# Patient Record
Sex: FEMALE | Race: ASIAN | NOT HISPANIC OR LATINO | Employment: FULL TIME | URBAN - METROPOLITAN AREA
[De-identification: names, ages, dates, MRNs, and addresses within clinical notes are randomized per-mention and may not be internally consistent; named-entity substitution may affect disease eponyms.]

---

## 2019-11-02 ENCOUNTER — NURSE TRIAGE (OUTPATIENT)
Dept: NURSING | Facility: CLINIC | Age: 34
End: 2019-11-02

## 2019-11-02 NOTE — TELEPHONE ENCOUNTER
"She had a tuba ligation 6 years ago. She just started using a mini pad today. She will call back and set up an appointment if anything worsens, changes or something else develops.  Iesha Mcgregor RN-[facility]      Additional Information    Negative: Shock suspected (e.g., cold/pale/clammy skin, too weak to stand, low BP, rapid pulse)    Negative: Difficult to awaken or acting confused (e.g., disoriented, slurred speech)    Negative: Passed out (i.e., lost consciousness, collapsed and was not responding)    Negative: Sounds like a life-threatening emergency to the triager    Negative: Followed a genital area injury    Negative: Pregnant > 20 weeks  (5 months or more)    Negative: Pregnant < 20 weeks  (less than 5 months)    Negative: Postpartum < 1 month since delivery (\"Did you recently give birth?\")    Negative: Bleeding occurring > 12 months after menopause    Negative: Bleeding from sexual abuse or rape    Negative: [1] Vaginal discharge is main symptom AND [2] small amount of blood    Negative: SEVERE abdominal pain     Pain at 4, comes and goes    Negative: SEVERE dizziness (e.g., unable to stand, requires support to walk, feels like passing out now)    Negative: SEVERE vaginal bleeding (i.e., soaking 2 pads or tampons per hour and present 2 or more hours; 1 menstrual cup every 2 hours)    Negative: Patient sounds very sick or weak to the triager    Negative: MODERATE vaginal bleeding (i.e., soaking 1 pad or tampon per hour and present > 6 hours; 1 menstrual cup every 6 hours)    Negative: [1] Constant abdominal pain AND [2] present > 2 hours    Negative: Pale skin (pallor) of new onset or worsening    Negative: Passed tissue (e.g., gray-white)    Negative: Taking Coumadin (warfarin) or other strong blood thinner, or known bleeding disorder (e.g., thrombocytopenia)    Negative: [1] Skin bruises or nosebleed AND [2] not caused by an injury    Negative: [1] Periods with > 6 soaked pads or " tampons per day AND [2] last > 7 days    Negative: [1] Bleeding or spotting after procedure (e.g., biopsy) or pelvic examination (e.g., pap smear) AND [2] lasts > 7 days    Negative: Periods with > 6 soaked pads or tampons per day    Negative: Periods last > 7 days    Negative: [1] Uses menstrual cups AND [2] more than 80 ml blood per menstrual period.    Negative: [1] Missed period AND [2] has occurred 2 or more times in the last year    Negative: [1] Menstrual cycle < 21 days OR > 35 days AND [2] occurs more than two cycles (2 months) this past year    Negative: [1] Bleeding or spotting between regular periods AND [2] occurs more than two cycles (2 months) this past year    Negative: [1] Bleeding or spotting between regular periods AND [2] occurs more than two cycles (2 months) AND [3] using birth control medicine (pills, patch, Depo-Provera, Implanon, vaginal ring, Mirena IUD)    Negative: Bleeding or spotting occurs after hysterectomy    Negative: Bleeding or spotting occurs after sex (Exception: first intercourse)    Negative: Normal menstrual flow    [1] Menstrual cycle < 21 days OR > 35 days AND [2] has occurred once this past year    Protocols used: VAGINAL BLEEDING - [id number]

## 2019-11-08 ENCOUNTER — HEALTH MAINTENANCE LETTER (OUTPATIENT)
Age: 34
End: 2019-11-08

## 2019-11-21 ENCOUNTER — OFFICE VISIT (OUTPATIENT)
Dept: OBGYN | Facility: CLINIC | Age: 34
End: 2019-11-21
Payer: COMMERCIAL

## 2019-11-21 VITALS
SYSTOLIC BLOOD PRESSURE: 104 MMHG | HEIGHT: 67 IN | DIASTOLIC BLOOD PRESSURE: 70 MMHG | WEIGHT: 172.5 LBS | BODY MASS INDEX: 27.07 KG/M2

## 2019-11-21 DIAGNOSIS — Z86.2 HISTORY OF IRON DEFICIENCY ANEMIA: ICD-10-CM

## 2019-11-21 DIAGNOSIS — Z86.39 HISTORY OF VITAMIN D DEFICIENCY: ICD-10-CM

## 2019-11-21 DIAGNOSIS — N92.6 IRREGULAR PERIODS: ICD-10-CM

## 2019-11-21 DIAGNOSIS — N76.0 BV (BACTERIAL VAGINOSIS): ICD-10-CM

## 2019-11-21 DIAGNOSIS — B96.89 BV (BACTERIAL VAGINOSIS): ICD-10-CM

## 2019-11-21 DIAGNOSIS — Z86.32 HISTORY OF INSULIN CONTROLLED GESTATIONAL DIABETES MELLITUS (GDM): ICD-10-CM

## 2019-11-21 DIAGNOSIS — Z01.419 WELL WOMAN EXAM: Primary | ICD-10-CM

## 2019-11-21 DIAGNOSIS — N89.8 VAGINAL DISCHARGE: ICD-10-CM

## 2019-11-21 DIAGNOSIS — Z12.4 CERVICAL CANCER SCREENING: ICD-10-CM

## 2019-11-21 LAB
CHOLEST SERPL-MCNC: 155 MG/DL
DEPRECATED CALCIDIOL+CALCIFEROL SERPL-MC: 9 UG/L (ref 20–75)
ERYTHROCYTE [DISTWIDTH] IN BLOOD BY AUTOMATED COUNT: 13.5 % (ref 10–15)
GLUCOSE SERPL-MCNC: 97 MG/DL (ref 70–99)
HCT VFR BLD AUTO: 37.5 % (ref 35–47)
HDLC SERPL-MCNC: 44 MG/DL
HGB BLD-MCNC: 12.5 G/DL (ref 11.7–15.7)
LDLC SERPL CALC-MCNC: 89 MG/DL
MCH RBC QN AUTO: 27.2 PG (ref 26.5–33)
MCHC RBC AUTO-ENTMCNC: 33.3 G/DL (ref 31.5–36.5)
MCV RBC AUTO: 82 FL (ref 78–100)
NONHDLC SERPL-MCNC: 111 MG/DL
PLATELET # BLD AUTO: 292 10E9/L (ref 150–450)
RBC # BLD AUTO: 4.6 10E12/L (ref 3.8–5.2)
SPECIMEN SOURCE: ABNORMAL
TRIGL SERPL-MCNC: 108 MG/DL
TSH SERPL DL<=0.005 MIU/L-ACNC: 1.55 MU/L (ref 0.4–4)
WBC # BLD AUTO: 4 10E9/L (ref 4–11)
WET PREP SPEC: ABNORMAL

## 2019-11-21 PROCEDURE — 99213 OFFICE O/P EST LOW 20 MIN: CPT | Mod: 25 | Performed by: ADVANCED PRACTICE MIDWIFE

## 2019-11-21 PROCEDURE — 80061 LIPID PANEL: CPT | Performed by: ADVANCED PRACTICE MIDWIFE

## 2019-11-21 PROCEDURE — 87624 HPV HI-RISK TYP POOLED RSLT: CPT | Performed by: ADVANCED PRACTICE MIDWIFE

## 2019-11-21 PROCEDURE — 87210 SMEAR WET MOUNT SALINE/INK: CPT | Performed by: ADVANCED PRACTICE MIDWIFE

## 2019-11-21 PROCEDURE — 85027 COMPLETE CBC AUTOMATED: CPT | Performed by: ADVANCED PRACTICE MIDWIFE

## 2019-11-21 PROCEDURE — 84443 ASSAY THYROID STIM HORMONE: CPT | Performed by: ADVANCED PRACTICE MIDWIFE

## 2019-11-21 PROCEDURE — G0145 SCR C/V CYTO,THINLAYER,RESCR: HCPCS | Performed by: ADVANCED PRACTICE MIDWIFE

## 2019-11-21 PROCEDURE — 82947 ASSAY GLUCOSE BLOOD QUANT: CPT | Performed by: ADVANCED PRACTICE MIDWIFE

## 2019-11-21 PROCEDURE — 99385 PREV VISIT NEW AGE 18-39: CPT | Performed by: ADVANCED PRACTICE MIDWIFE

## 2019-11-21 PROCEDURE — 82306 VITAMIN D 25 HYDROXY: CPT | Performed by: ADVANCED PRACTICE MIDWIFE

## 2019-11-21 PROCEDURE — 36415 COLL VENOUS BLD VENIPUNCTURE: CPT | Performed by: ADVANCED PRACTICE MIDWIFE

## 2019-11-21 RX ORDER — METRONIDAZOLE 500 MG/1
500 TABLET ORAL 2 TIMES DAILY
Qty: 14 TABLET | Refills: 0 | Status: SHIPPED | OUTPATIENT
Start: 2019-11-21 | End: 2022-03-09

## 2019-11-21 ASSESSMENT — MIFFLIN-ST. JEOR: SCORE: 1505.08

## 2019-11-21 NOTE — NURSING NOTE
"Chief Complaint   Patient presents with     Physical       Initial /70   Ht 1.702 m (5' 7\")   Wt 78.2 kg (172 lb 8 oz)   LMP 10/17/2019   Breastfeeding No   BMI 27.02 kg/m   Estimated body mass index is 27.02 kg/m  as calculated from the following:    Height as of this encounter: 1.702 m (5' 7\").    Weight as of this encounter: 78.2 kg (172 lb 8 oz).  BP completed using cuff size: regular    Questioned patient about current smoking habits.  Pt. has never smoked.          The following HM Due: NONE    Jamil Douglas MA      "

## 2019-11-21 NOTE — PROGRESS NOTES
Luis is a 34 year old  female who presents for annual exam.     Besides routine health maintenance,  she would like to discuss irregular periods.  HPI:  Luis normally has regular cycles every 28-30 days. In May she started intermittent fasting and exercising regularly and has noted some period irregularities at the same time. She missed a period in , then had normal cycles, then missed a period in September, and had normal periods Oct 17 and Nov 12. Periods normally last 6-7 days of regular bleeding. No severe cramps or heavy bleeding. Mid cycle she had spotting from -7 every time she wiped after voiding. Denies constipation, does report a hemorrhoid which has never caused her problems. Has had increase in white vaginal discharge this week. No vaginal itching or burning. No urinary symptoms.   LMP 2019  Using tubal ligation for contraception since .        REPRODUCTIVE/GYNECOLOGIC HISTORY:  Luis is sexually active with male partner(s) and is currently in monogamous relationship.   STI testing offered?  Declined  History of abnormal Pap smear:  No  SOCIAL HISTORY  Abuse: does not report having previously been physical or sexually abused.    Do you feel safe in your environment? yes    She  reports that she has never smoked. She has never used smokeless tobacco.      HEALTH MAINTENANCE:  Cholesterol: (  Cholesterol   Date Value Ref Range Status   2015 133 <200 mg/dL Final     Comment:     LDL Cholesterol is the primary guide to therapy.   The NCEP recommends further evaluation of: patients with cholesterol greater   than 200 mg/dL if additional risk factors are present, cholesterol greater   than   240 mg/dL, triglycerides greater than 150 mg/dL, or HDL less than 40 mg/dL.     2010 149 0 - 200 mg/dL Final     Comment:     LDL Cholesterol is the primary guide to therapy.   The NCEP recommends further evaluation of: patients with cholesterol <200   mg/dL   if additional risk  factors are present, cholesterol >240 mg/dL, triglycerides   >150 mg/dL, or HDL <40 mg/dL.    History of abnormal lipids: No  Mammo: N/A . History of abnormal Mammo: Not applicable.  Regular Self Breast Exams: Yes  TSH: (No results found for: TSH )  Pap; (  Lab Results   Component Value Date    PAP NIL 2015    PAP NIL 2012    )  Immunizations up to date: yes  (Gardasil, Tdap, Flu)  Health maintenance updated:  yes    HEALTHY HABITS  Eating habits: follows vegetarian diet  Calcium/Vitamin D intake: source:  dairy Adequate?   Exercise: How often do you exercise? 3-5 times/week;Cardio  Have you had an eye exam in the last two years? yes  Do you routinely see the dentist once or twice yearly? yes      HISTORY:  OB History    Para Term  AB Living   2 2 1 0 0 2   SAB TAB Ectopic Multiple Live Births   0 0 0 0 2      # Outcome Date GA Lbr Francisco/2nd Weight Sex Delivery Anes PTL Lv   2 Term 05/15/13 40w6d 03:50 / 03:48 4 kg (8 lb 13.1 oz) F Vag-Spont  N MARIE      Name: CHEMUDUPATI,G1 SITHA      Apgar1: 9  Apgar5: 9   1 Para  40w0d  3.572 kg (7 lb 14 oz) F    MARIE      Birth Comments: Gestational diabetes      Name: Marion     Past Medical History:   Diagnosis Date     Gestational diabetes mellitus, class A2     pregnancy  - required insulin     Impaired fasting glucose      Iron deficiency anemia     diagnosed at [facility]      Seizures (H)     had a seizure during delivery     Vitamin D deficiency      Past Surgical History:   Procedure Laterality Date     LAPAROSCOPIC TUBAL LIGATION Bilateral 2013    Procedure: LAPAROSCOPIC TUBAL LIGATION;  Surgeon: Nicole Garcia MD;  Location:  OR     none       Family History   Problem Relation Age of Onset     Family History Negative No family hx of         per pt     Social History     Socioeconomic History     Marital status:      Spouse name: None     Number of children: 1     Years of education: None     Highest education  "level: None   Occupational History     Employer: HOMEMAKER   Social Needs     Financial resource strain: None     Food insecurity:     Worry: None     Inability: None     Transportation needs:     Medical: None     Non-medical: None   Tobacco Use     Smoking status: Never Smoker     Smokeless tobacco: Never Used   Substance and Sexual Activity     Alcohol use: No     Drug use: No     Sexual activity: Yes     Partners: Male   Lifestyle     Physical activity:     Days per week: None     Minutes per session: None     Stress: None   Relationships     Social connections:     Talks on phone: None     Gets together: None     Attends [employer] service: None     Active member of club or organization: None     Attends meetings of clubs or organizations: None     Relationship status: None     Intimate partner violence:     Fear of current or ex partner: None     Emotionally abused: None     Physically abused: None     Forced sexual activity: None   Other Topics Concern     None   Social History Narrative    , 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       Current Outpatient Medications:      metroNIDAZOLE (FLAGYL) 500 MG tablet, Take 1 tablet (500 mg) by mouth 2 times daily, Disp: 14 tablet, Rfl: 0   No Known Allergies    Past medical, surgical, social and family history were reviewed and updated in EPIC.    ROS:   12 point review of systems negative other than symptoms noted below or in the HPI.    PHYSICAL EXAM:  /70   Ht 1.702 m (5' 7\")   Wt 78.2 kg (172 lb 8 oz)   LMP 10/17/2019   Breastfeeding No   BMI 27.02 kg/m     BMI: Body mass index is 27.02 kg/m .  Constitutional: healthy, alert and no distress  Neck: symmetrical, thyroid normal size, no masses present, no lymphadenopathy present.   Cardiovascular: RRR, no murmurs present  Respiratory: breathing unlabored, lungs CTA bilaterally  Breast:normal without masses, tenderness or nipple discharge and no palpable axillary masses or adenopathy  Gastrointestinal: " abdomen soft, non-tender, bowel sounds present  PELVIC EXAM:  Vulva: No lesions, no adenopathy, BUS WNL  Vagina: Moist, pink, discharge consistent with BV  well rugated, no lesions  Cervix:smooth, pink, cervical ectropion noted, non-friable, no bleeding  Uterus: Normal size, non-tender, mobile  Ovaries: No masses palpated  Rectal exam: small external hemorrhoid noted, flesh colored, non tender    ASSESSMENT/PLAN:    ICD-10-CM    1. Well woman exam Z01.419 Lipid panel reflex to direct LDL Fasting   2. History of insulin controlled gestational diabetes mellitus (GDM) Z86.32 Glucose   3. Irregular periods N92.6 CBC with platelets     TSH with free T4 reflex   4. Vaginal discharge N89.8 Wet prep   5. History of iron deficiency anemia Z86.2 CBC with platelets   6. Cervical cancer screening Z12.4 PAP imaged thin layer screen     HPV High Risk Types DNA Cervical   7. History of vitamin D deficiency Z86.39 Vitamin D Deficiency   8. BV (bacterial vaginosis) N76.0 metroNIDAZOLE (FLAGYL) 500 MG tablet    B96.89      Results for orders placed or performed in visit on 11/21/19   CBC with platelets     Status: None   Result Value Ref Range    WBC 4.0 4.0 - 11.0 10e9/L    RBC Count 4.60 3.8 - 5.2 10e12/L    Hemoglobin 12.5 11.7 - 15.7 g/dL    Hematocrit 37.5 35.0 - 47.0 %    MCV 82 78 - 100 fl    MCH 27.2 26.5 - 33.0 pg    MCHC 33.3 31.5 - 36.5 g/dL    RDW 13.5 10.0 - 15.0 %    Platelet Count 292 150 - 450 10e9/L   Wet prep     Status: Abnormal   Result Value Ref Range    Specimen Description Vagina     Wet Prep No Trichomonas seen     Wet Prep Few  Clue cells seen   (A)     Wet Prep No yeast seen     Wet Prep No WBC's seen      Irregular cycles- Likely due to change in diet/exercise. Plan wet prep, TSH/T4, Pap today. If all normal, continue to monitor. Offered ultrasound and hormone testing, patient declines and prefers to monitor as she continues diet/exercise to see if cycles normalize. Does not want OCPs/hormonal treatment at  this time.  Use preparation H on hemorrhoid, could be cause of intermenstrual spotting after wiping.     Wet prep positive for clue cells- will order antibiotics     COUNSELING:   Reviewed preventive health counseling, as reflected in patient instructions  Special attention given to:        Regular exercise       Healthy diet/nutrition   reports that she has never smoked. She has never used smokeless tobacco.      Return in 1 year or PRN    Lilo Arroyo CNM, DINA-BC

## 2019-11-26 LAB
COPATH REPORT: NORMAL
PAP: NORMAL

## 2019-11-28 LAB
FINAL DIAGNOSIS: NORMAL
HPV HR 12 DNA CVX QL NAA+PROBE: NEGATIVE
HPV16 DNA SPEC QL NAA+PROBE: NEGATIVE
HPV18 DNA SPEC QL NAA+PROBE: NEGATIVE
SPECIMEN DESCRIPTION: NORMAL
SPECIMEN SOURCE CVX/VAG CYTO: NORMAL

## 2019-12-24 ENCOUNTER — TELEPHONE (OUTPATIENT)
Dept: OBGYN | Facility: CLINIC | Age: 34
End: 2019-12-24

## 2019-12-24 NOTE — TELEPHONE ENCOUNTER
Pt calls to state that she is on cycle day 43 and she has not had her period.  Tubal.    She also used the flagyl for 7 days and still has vaginal itching (started last night).  White discharge, no odor.  Wonders if she needs any further tx?    Advised that I would share with Lilo and see what her thoughts are.  Discussed period could be abnml due to stress or change in periods.      Deborah MICHELLE R.N.  [facility]

## 2019-12-27 NOTE — TELEPHONE ENCOUNTER
If the patient desires to start birth control pills that would likely regulate her periods. They may be changing due to early shelby-menopause. All of her initial labs were normal at her visit with me. If she desires further work up with hormone testing, she should return to clinic. Vaginal itching and discharge could be due to yeast infection, which sometimes occurs after treatment with antibiotics (Flagyl). She can try Monistat 7 day or return to clinic for wet prep. If symptoms persisted after trying Monistat she should come to clinic.     Lilo Arroyo, TRACY, WHNP-BC

## 2019-12-30 ENCOUNTER — MYC MEDICAL ADVICE (OUTPATIENT)
Dept: OBGYN | Facility: CLINIC | Age: 34
End: 2019-12-30

## 2020-01-07 NOTE — TELEPHONE ENCOUNTER
Pt called back in - states she got her period last night and is wondering if she should continue to use Monistat 7 or hold off.    Ok to leave detailed message or respond back via [employer] preferable before this evening.    Valentina Mary Patient Representative

## 2021-01-15 ENCOUNTER — HEALTH MAINTENANCE LETTER (OUTPATIENT)
Age: 36
End: 2021-01-15

## 2021-09-25 ENCOUNTER — HEALTH MAINTENANCE LETTER (OUTPATIENT)
Age: 36
End: 2021-09-25

## 2022-02-23 ENCOUNTER — TELEPHONE (OUTPATIENT)
Dept: PEDIATRICS | Facility: CLINIC | Age: 37
End: 2022-02-23
Payer: COMMERCIAL

## 2022-02-23 NOTE — TELEPHONE ENCOUNTER
6 days she has been spotting after her period stopped. No chance of pregnancy. TL in 2013. She would like to see OBGYN. Transferred call. Zaria Jones RN on 2/23/2022 at 12:29 PM

## 2022-03-09 ENCOUNTER — OFFICE VISIT (OUTPATIENT)
Dept: MIDWIFE SERVICES | Facility: CLINIC | Age: 37
End: 2022-03-09
Payer: COMMERCIAL

## 2022-03-09 VITALS — SYSTOLIC BLOOD PRESSURE: 116 MMHG | WEIGHT: 177.8 LBS | BODY MASS INDEX: 27.85 KG/M2 | DIASTOLIC BLOOD PRESSURE: 90 MMHG

## 2022-03-09 DIAGNOSIS — N92.0 SPOTTING BETWEEN MENSES: Primary | ICD-10-CM

## 2022-03-09 LAB
CLUE CELLS: ABNORMAL
TRICHOMONAS, WET PREP: ABNORMAL
WBC'S/HIGH POWER FIELD, WET PREP: ABNORMAL
YEAST, WET PREP: ABNORMAL

## 2022-03-09 PROCEDURE — 99213 OFFICE O/P EST LOW 20 MIN: CPT | Performed by: REGISTERED NURSE

## 2022-03-09 PROCEDURE — 87210 SMEAR WET MOUNT SALINE/INK: CPT | Performed by: REGISTERED NURSE

## 2022-03-09 PROCEDURE — 87491 CHLMYD TRACH DNA AMP PROBE: CPT | Performed by: REGISTERED NURSE

## 2022-03-09 PROCEDURE — 87591 N.GONORRHOEAE DNA AMP PROB: CPT | Performed by: REGISTERED NURSE

## 2022-03-09 NOTE — PATIENT INSTRUCTIONS
Dysmenorrhea or painful menstruation     If you have painful periods consider trying the following:      Note on your calendar the beginning and end of each of your periods.       Try to anticipate your period by a couple days and take ibuprofen(Advil or Motrin) 800 mg three times a day for a day or two before your period starts or you can use naproxen (Aleve) 500 mg twice a day.       When your period starts continue with the ibuprofen for as long as you have cramping.       If the ibuprofen doesn't work completely you could add acetaminophen (Tylenol) 650 mg every four hours or 1000 mg every 6 hours      You can also use heat to your abdomen or back either in a tub or shower or with a heating pad.  You may also purchase Thermacare which stick to your abdomen or back and supply heat for up to 8 hours.       During your period try to get a good amount of sleep, eat a healthy well balanced diet, and make sure you drink plenty of water    If these methods do not improve you symptoms make an appointment to see your midwife to discuss hormonal options.    Please call with questions/concerns:    [facility] Certified Nurse Midwives        Patient Education     Understanding PMS and Your Cycle  PMS (premenstrual syndrome) is a medical condition caused by the body's response to a normal menstrual cycle. The menstrual cycle is brought on by changing levels of hormones (chemical messengers) in the body. In some women, normal hormone changes are linked to decreases in serotonin. This is a brain chemical that improves mood. These changes lead to PMS symptoms each month.   The menstrual cycle  During the menstrual cycle, a series of hormone changes prepare a woman's body for pregnancy. The ovaries make hormones, which include estrogen and progesterone. During the cycle, these hormone levels change. This causes the uterus lining to thicken. Hormone changes also lead to the release of an egg (ovulation). If a woman doesn't  become pregnant, her body sheds the thickened lining and the egg during the menstrual period. For many women, the menstrual cycle lasts 4 weeks (28 days). Some women have shorter cycles. Others have longer ones. No matter how many days your cycle is, you can have PMS only if you ovulate.   The PMS cycle  No one knows why some women have PMS and others don't. But PMS symptoms are closely linked to changing levels of estrogen, serotonin, and progesterone:     Estrogen rises during the first half of the menstrual cycle and drops during the second half. In some women, serotonin levels stay mostly steady. But in women with PMS, serotonin drops as estrogen drops. This means serotonin is lowest in the 2 weeks before the period. Women with low serotonin levels are likely to have PMS symptoms.    Progesterone can have a calming effect on the body. This can ease physical symptoms caused by the body's monthly changes. In women with PMS, progesterone may not have this calming effect. This may make symptoms more severe.  Common symptoms of PMS  Physical symptoms  You may have some or all of the following:    Bloating (retaining water)    Breast soreness    Food cravings    Muscle aches    Swollen hands and feet    Appetite changes    Headache    Feeling tired    Skin problems    Belly (abdominal) pain  Emotional symptoms  You may have some or all of the following:    Mood swings    Depression    Crying spells    Being grouchy    Easily upset    Not being around friends and family    Forgetting things    Having trouble focusing    Anxiety    Having trouble sleeping    Angry outbursts    Confusion    Changes in sexual desire  [employer] last reviewed this educational content on 5/1/2020 2000-2021 [employer]. All rights reserved. This information is not intended as a substitute for professional medical care. Always follow your healthcare professional's instructions.           Patient Education     Managing PMS: Diet  "and Nutrition     Nutrients in fresh fruits and vegetables can help you manage PMS.   Maintaining a healthy diet helps your body counter PMS. Certain foods boost serotonin levels and give you the energy to cope with symptoms. Other foods can be avoided to ease symptoms.   About supplements  Ask your healthcare provider about supplements before trying them.  Benefits of a balanced diet  To counter PMS symptoms, maintain a balanced diet. Eat foods from all the food groups: dairy, grains, fruits and vegetables, and protein. When planning meals, know that:     Calcium may ease mood swings, headache, bloating, and irritability. It's found in dairy products such as milk, cheese, and yogurt. Some juices, breads, cereals, and soy products have calcium added (fortified).    Magnesium may relieve bloating and breast tenderness. It's found in many foods, including fresh fruits and vegetables. To help your body get enough magnesium, eat 5 or more servings of a variety of fruits and vegetables a day.    Vitamin B-6 helps the body use serotonin, thereby helping to ease depression. It's found in chicken, fish, potatoes, eggs, and carrots.    Vitamin E may reduce headache and breast tenderness. It's found in nuts such as almonds, peanuts, and hazelnuts. It's also found in green leafy vegetables.  \"Good mood\" foods  Eating foods high in carbohydrates (carbs) and fiber can help you manage PMS. That's because carbs raise serotonin levels. Carbs are also your body's main source of energy. To help keep energy and serotonin levels steady, eat small amounts throughout the day. High-fiber carbs include:     Whole-grain foods. Brown rice, whole-wheat pasta, whole-grain bread, and buckwheat noodles are good choices.    Fresh fruits and vegetables. These are especially fiber-rich when eaten unpeeled.    Beans and legumes. These include kidney beans, peas, and lentils.  Foods to limit  Some foods can make PMS symptoms worse. Know that:    Salt " can cause bloating. Since canned vegetables are often high in salt, buy fresh instead. Flavor with herbs, lemon, or salt-free seasonings.    Sugar is a carb that provides only short bursts of energy. If you crave sugar, choose a food that's also high in fiber, like an unpeeled apple or a bran muffin.    Caffeine can disrupt sleep, which makes symptoms harder to cope with. Caffeine can also cause breast tenderness. Try to limit chocolate and caffeinated drinks, such as coffee or soda.    Alcohol can make you feel depressed and can disrupt sleep. Many kinds of alcohol are also high in sugar. You may try limiting the amount of alcohol you drink.  [employer] last reviewed this educational content on 5/1/2020 2000-2021 [employer]. All rights reserved. This information is not intended as a substitute for professional medical care. Always follow your healthcare professional's instructions.           Patient Education     Managing PMS: Lifestyle Changes  Coping with PMS takes energy. But PMS symptoms can make you feel like you don t have the strength to cope. The key is to work helpful strategies into your daily life. Be active during the day and get enough sleep at night. Take time to relax. And don t be afraid to ask for support.  Being active    Activity raises the amount of oxygen in your body. This makes you feel better and gives you more energy. Exercise may also raise serotonin levels. For best results:  ? Try aerobic activities, such as walking, jogging, biking, swimming, or yoga.  ? Exercise for 30 minutes, most days of the week. If this seems like too much, start with 10 minutes a day and work your way up.  ? Find ways to fit activity into your day. Try taking the stairs instead of the elevator.    Sleeping well  When you re tired, PMS symptoms can be harder to cope with. Aim for at least 8 hours of sleep a night. To sleep better:    Follow a routine before bed. For instance, brush your teeth, read  for half an hour, and turn out the lights at 10 p.m. every night.    Pull down window shades and keep pets out of the bedroom. If you re a light sleeper, try wearing earplugs and an eye mask to block out noise and light.  Taking time to relax  Being relaxed can give you the energy to deal with life s ups and downs. This makes even PMS symptoms easier to cope with. Learn to relax through simple techniques you can do anytime, anywhere. If you think you re too busy, start with just 5 minutes a day. Try:    Taking in a slow, deep breath through your nose. Hold it for 5 counts, then exhale through your mouth. Repeat this 3 times.    Picturing yourself in a peaceful place, such as the countryside. Explore with your mind. Hear birds. Smell freshly cut grass. Enjoy a mental vacation.    Stretching to relax muscles and reduce aches. (If you have back problems, ask your healthcare provider about stretches that are safe for you.)  Finding support  You don t have to deal with PMS alone. To help you cope:    Talk with family and friends. Let them know how they can help when you re dealing with PMS symptoms.    Chat with female friends. Support each other. You may learn some new coping strategies.    Join a support group for women with PMS. Or try a stress management group. Ask your healthcare provider for resources.  Jose Rafael last reviewed this educational content on 2/1/2020 2000-2021 [employer]. All rights reserved. This information is not intended as a substitute for professional medical care. Always follow your healthcare professional's instructions.

## 2022-03-09 NOTE — NURSING NOTE
"Chief Complaint   Patient presents with     Vaginal Bleeding     LMP 2/10/22- had spotting until 3/1       Initial BP (!) 116/90 (Cuff Size: Adult Regular)   Wt 80.6 kg (177 lb 12.8 oz)   LMP 02/10/2022   BMI 27.85 kg/m   Estimated body mass index is 27.85 kg/m  as calculated from the following:    Height as of 19: 1.702 m (5' 7\").    Weight as of this encounter: 80.6 kg (177 lb 12.8 oz).  BP completed using cuff size: regular    Questioned patient about current smoking habits.  Pt. has never smoked.          "

## 2022-03-09 NOTE — PROGRESS NOTES
Midwife Dysfunctional Uterine Bleeding Note    Date: 3/8/2022    CC:  Abnormal Uterine Bleeding    HPI:  Luis Duke is a 37 year old female  presents for evaluation of abnormal uterine bleeding.    Duration of bleeding problem: 1 month  Frequency of bleeding: light spotting since lmp (2/10/22) which stopped on 3/1/22  Flow: soaking about a quarter-sized spot on 1 panty liner each day  Post-coital bleeding: no  Pelvic pain: no; mild cramping with periods, but not continuous or midcycle    Her work-up thus far for her abnormal bleeding has included:  - pregnancy test: no, tubal ligation   - TSH: 1.55, WNL (2019)  - transvaginal ultrasound: no  - Hgb: 12.7, WNL (2019)   - STI screen: no STI history, will offer testing today  - Last pap: WNL (2019)    Patient has tried to following treatments: previously had irregular cycles with intermenstrual spotting in , but has had regular cycles without BTB since that time. At that time she declined an OCP or hormonal IUD to help regulate her cycles.      GYN HISTORY:  Patient's last menstrual period was 02/10/2022.    Menarche: 12 y.o.  Menopause: no  Menses: occured every 28-29 days lasting 6 - 7 days in duration.   STI history: No STD history  History of abnormal pap:  No history of abnormal pap  History of cervical procedures: no   Contraceptive History: OCP (only for 2 months in between pregnancies), Tubal Ligation in   The patient is sexually active with 1 male partner.     Patient has following risk factors for endometrial cancer:  - Unopposed estrogen exposure: No  - Obesity: No  - Smoking: No  - Nulliparity: No  - Infertility: No  - Early age of menarche / late age of menopause: No  - Family history of colon cancer, ovarian cancer, and type I endometrial cancer: No    OBSTETRIC HISTORY:   OB History    Para Term  AB Living   2 2 1 0 0 2   SAB IAB Ectopic Multiple Live Births   0 0 0 0 2      # Outcome Date GA Lbr  Francisco/2nd Weight Sex Delivery Anes PTL Lv   2 Term 05/15/13 40w6d 03:50 / 03:48 4 kg (8 lb 13.1 oz) F Vag-Spont  N MARIE      Name: CAM GRANT      Apgar1: 9  Apgar5: 9   1 Para  40w0d  3.572 kg (7 lb 14 oz) F    MARIE      Birth Comments: Gestational diabetes      Name: Marion       Past Medical History:   Diagnosis Date     Gestational diabetes mellitus, class A2     pregnancy  - required insulin     Impaired fasting glucose      Iron deficiency anemia     diagnosed at [facility]      Seizures (H)     had a seizure during delivery     Vitamin D deficiency        Past Surgical History:   Procedure Laterality Date     LAPAROSCOPIC TUBAL LIGATION Bilateral 2013    Procedure: LAPAROSCOPIC TUBAL LIGATION;  Surgeon: Nicole Garcia MD;  Location:  OR     none         SOCIAL HISTORY:  Lives with  and two children.  Works as an , no occupational hazards.  Tobacco: no  Alcohol: no  Drugs: no  History of abuse: no    Family History   Problem Relation Age of Onset     Family History Negative No family hx of         per pt     There is no family history of uterine, ovarian, breast, colon cancer.     Current Outpatient Medications   Medication Sig Dispense Refill     metroNIDAZOLE (FLAGYL) 500 MG tablet Take 1 tablet (500 mg) by mouth 2 times daily 14 tablet 0       Allergies: Patient has no known allergies.    ROS:  C: NEGATIVE for fever, chills, change in weight  I: NEGATIVE for worrisome rashes, moles or lesions  E: NEGATIVE for vision changes or irritation  E/M: NEGATIVE for ear, mouth and throat problems  R: NEGATIVE for significant cough or SOB  CV: NEGATIVE for chest pain, palpitations or peripheral edema  GI: NEGATIVE for nausea, abdominal pain, heartburn, or change in bowel habits  : POSITIVE for abnormal bleeding x1 cycle as described above, NEGATIVE for frequency, dysuria, hematuria, vaginal discharge  M: NEGATIVE for significant arthralgias or myalgia  N: NEGATIVE for  weakness, dizziness or paresthesias  E: NEGATIVE for temperature intolerance, skin/hair changes  P: NEGATIVE for changes in mood or affect    EXAM:  Blood pressure (!) 116/90, weight 80.6 kg (177 lb 12.8 oz), last menstrual period 02/10/2022, not currently breastfeeding.   BMI= Body mass index is 27.85 kg/m .  General - pleasant female in no acute distress.  Abdomen - soft, nontender  Pelvic - external genitalia: normal adult female without lesions or abnormalities   BUS: within normal limits  Vagina: well rugated, no discharge, no lesions  Cervix: no lesions or CMT  Uterus: Normal size, mobile and nontender  Adnexae: no masses or tenderness.  Rectovaginal - deferred.  Musculoskeletal - no gross deformities.  Neurological - normal strength, sensation, and mental status.      ASSESSMENT:  Luis Duke is a 37 year old  who presents with abnormal uterine bleeding.     PLAN:  1. (N92.3) Spotting between menses  (primary encounter diagnosis)  Plan: NEISSERIA GONORRHOEA PCR, CHLAMYDIA TRACHOMATIS        PCR, Wet prep - Clinic Collect     - Endometrial biopsy was not done today to rule out hyperplasia and malignancy, may consider in the future if intermenstrual bleeding continues x3 consecutive months.   - Transvaginal ultrasound: no, may consider in the future if intermenstrual bleeding continues x3 consecutive months   - Cervical cancer screening: no, pap smear up to date    Discussed with patient the potential causes for intermenstrual bleeding. Today a wet prep and G/C was collected to rule out infection. Recommended that patient follow up if intermenstrual bleeding persists for 3 consecutive cycles. At that time, may consider further testing. Also discussed PMS symptoms and comfort measures to help alleviate discomforts prior to and during menses. Patient also counseled that she should follow up for a routine annual exam and at that time preventative screenings can be conducted.       Kelsie Reynolds,  [facility]    Update:   Results for orders placed or performed in visit on 03/09/22   Wet prep - Clinic Collect     Status: Abnormal    Specimen: Vagina; Swab   Result Value Ref Range    Trichomonas Absent Absent    Yeast Absent Absent    Clue Cells Absent Absent    WBCs/high power field 1+ (A) None     Wet prep negative. Nothing to treat. GC/CT pending.    Discussed normalcy of sporadic changes to menstrual cycles. Plan for annual with check in on spotting with possible further evaluation with pelvic US in the next few months. Has been looking for a provider to have annual with but her previous doctors have left their practices due to COVID.     The student completed the history of present illness, past medical history and review of systems. I completed the physical portion of the visit.    I was present with the student who participated in the service and documentation of the services provided. I have verified the history and personally performed the physical exam and medical decision making as documented by the student and edited by me.    DANY Muro CNM

## 2022-03-10 LAB
C TRACH DNA SPEC QL NAA+PROBE: NEGATIVE
N GONORRHOEA DNA SPEC QL NAA+PROBE: NEGATIVE

## 2022-03-12 ENCOUNTER — HEALTH MAINTENANCE LETTER (OUTPATIENT)
Age: 37
End: 2022-03-12

## 2022-08-29 ENCOUNTER — NURSE TRIAGE (OUTPATIENT)
Dept: NURSING | Facility: CLINIC | Age: 37
End: 2022-08-29

## 2022-08-29 NOTE — TELEPHONE ENCOUNTER
"Covid screening completed    Having left side abdominal pain, rated 5-6/10.  Started Friday.    Very hard BM. No Bm yesterday.    No blood in stools.    Needs to be seen in next 4 hours or UC.    Care advice given per protocol recommendations. Caller verbalizes understanding    Warm transferred to scheduling    Domonique GARCIA [facility] Nurse Advisor      Reason for Disposition    [1] MILD-MODERATE pain AND [2] constant AND [3] present > 2 hours    Additional Information    Negative: Shock suspected (e.g., cold/pale/clammy skin, too weak to stand, low BP, rapid pulse)    Negative: Difficult to awaken or acting confused (e.g., disoriented, slurred speech)    Negative: Passed out (i.e., lost consciousness, collapsed and was not responding)    Negative: Sounds like a life-threatening emergency to the triager    Negative: [1] SEVERE pain (e.g., excruciating) AND [2] present > 1 hour    Negative: [1] SEVERE pain AND [2] age > 60 years    Negative: [1] Vomiting AND [2] contains red blood or black (\"coffee ground\") material  (Exception: few red streaks in vomit that only happened once)    Negative: Blood in bowel movements (Exception: blood on surface of BM with constipation)    Negative: Black or tarry bowel movements (Exception: chronic-unchanged black-grey bowel movements AND is taking iron pills or Pepto-bismol)    Negative: Patient sounds very sick or weak to the triager    Protocols used: ABDOMINAL PAIN - FEMALE-A-AH      "

## 2023-04-22 ENCOUNTER — HEALTH MAINTENANCE LETTER (OUTPATIENT)
Age: 38
End: 2023-04-22

## 2023-06-05 ENCOUNTER — ANCILLARY ORDERS (OUTPATIENT)
Dept: PEDIATRICS | Facility: CLINIC | Age: 38
End: 2023-06-05

## 2023-06-05 DIAGNOSIS — Z12.31 VISIT FOR SCREENING MAMMOGRAM: ICD-10-CM

## 2023-06-06 SDOH — ECONOMIC STABILITY: INCOME INSECURITY: HOW HARD IS IT FOR YOU TO PAY FOR THE VERY BASICS LIKE FOOD, HOUSING, MEDICAL CARE, AND HEATING?: NOT HARD AT ALL

## 2023-06-06 SDOH — HEALTH STABILITY: PHYSICAL HEALTH: ON AVERAGE, HOW MANY DAYS PER WEEK DO YOU ENGAGE IN MODERATE TO STRENUOUS EXERCISE (LIKE A BRISK WALK)?: 4 DAYS

## 2023-06-06 SDOH — ECONOMIC STABILITY: TRANSPORTATION INSECURITY
IN THE PAST 12 MONTHS, HAS THE LACK OF TRANSPORTATION KEPT YOU FROM MEDICAL APPOINTMENTS OR FROM GETTING MEDICATIONS?: NO

## 2023-06-06 SDOH — ECONOMIC STABILITY: FOOD INSECURITY: WITHIN THE PAST 12 MONTHS, THE FOOD YOU BOUGHT JUST DIDN'T LAST AND YOU DIDN'T HAVE MONEY TO GET MORE.: NEVER TRUE

## 2023-06-06 SDOH — HEALTH STABILITY: PHYSICAL HEALTH: ON AVERAGE, HOW MANY MINUTES DO YOU ENGAGE IN EXERCISE AT THIS LEVEL?: 30 MIN

## 2023-06-06 SDOH — ECONOMIC STABILITY: INCOME INSECURITY: IN THE LAST 12 MONTHS, WAS THERE A TIME WHEN YOU WERE NOT ABLE TO PAY THE MORTGAGE OR RENT ON TIME?: NO

## 2023-06-06 SDOH — ECONOMIC STABILITY: TRANSPORTATION INSECURITY
IN THE PAST 12 MONTHS, HAS LACK OF TRANSPORTATION KEPT YOU FROM MEETINGS, WORK, OR FROM GETTING THINGS NEEDED FOR DAILY LIVING?: NO

## 2023-06-06 SDOH — ECONOMIC STABILITY: FOOD INSECURITY: WITHIN THE PAST 12 MONTHS, YOU WORRIED THAT YOUR FOOD WOULD RUN OUT BEFORE YOU GOT MONEY TO BUY MORE.: NEVER TRUE

## 2023-06-06 ASSESSMENT — ENCOUNTER SYMPTOMS
PALPITATIONS: 0
HEADACHES: 0
DYSURIA: 0
MYALGIAS: 0
JOINT SWELLING: 0
HEMATOCHEZIA: 0
ARTHRALGIAS: 0
BREAST MASS: 0
EYE PAIN: 0
PARESTHESIAS: 0
FREQUENCY: 0
SHORTNESS OF BREATH: 0
DIZZINESS: 0
DIARRHEA: 0
ABDOMINAL PAIN: 0
NERVOUS/ANXIOUS: 0
CONSTIPATION: 0
FEVER: 0
HEARTBURN: 0
NAUSEA: 0
CHILLS: 0
WEAKNESS: 0
SORE THROAT: 0
HEMATURIA: 0
COUGH: 0

## 2023-06-06 ASSESSMENT — SOCIAL DETERMINANTS OF HEALTH (SDOH)
IN A TYPICAL WEEK, HOW MANY TIMES DO YOU TALK ON THE PHONE WITH FAMILY, FRIENDS, OR NEIGHBORS?: MORE THAN THREE TIMES A WEEK
DO YOU BELONG TO ANY CLUBS OR ORGANIZATIONS SUCH AS CHURCH GROUPS UNIONS, FRATERNAL OR ATHLETIC GROUPS, OR SCHOOL GROUPS?: NO
HOW OFTEN DO YOU GET TOGETHER WITH FRIENDS OR RELATIVES?: MORE THAN THREE TIMES A WEEK
HOW OFTEN DO YOU ATTEND CHURCH OR RELIGIOUS SERVICES?: MORE THAN 4 TIMES PER YEAR

## 2023-06-06 ASSESSMENT — LIFESTYLE VARIABLES
HOW OFTEN DO YOU HAVE SIX OR MORE DRINKS ON ONE OCCASION: NEVER
HOW MANY STANDARD DRINKS CONTAINING ALCOHOL DO YOU HAVE ON A TYPICAL DAY: PATIENT DOES NOT DRINK
AUDIT-C TOTAL SCORE: 0
SKIP TO QUESTIONS 9-10: 1
HOW OFTEN DO YOU HAVE A DRINK CONTAINING ALCOHOL: NEVER

## 2023-06-09 ENCOUNTER — ANCILLARY ORDERS (OUTPATIENT)
Dept: PEDIATRICS | Facility: CLINIC | Age: 38
End: 2023-06-09

## 2023-06-09 ENCOUNTER — OFFICE VISIT (OUTPATIENT)
Dept: PEDIATRICS | Facility: CLINIC | Age: 38
End: 2023-06-09
Payer: COMMERCIAL

## 2023-06-09 ENCOUNTER — ANCILLARY ORDERS (OUTPATIENT)
Dept: MAMMOGRAPHY | Facility: CLINIC | Age: 38
End: 2023-06-09

## 2023-06-09 ENCOUNTER — ANCILLARY PROCEDURE (OUTPATIENT)
Dept: MAMMOGRAPHY | Facility: CLINIC | Age: 38
End: 2023-06-09
Payer: COMMERCIAL

## 2023-06-09 VITALS
SYSTOLIC BLOOD PRESSURE: 120 MMHG | OXYGEN SATURATION: 100 % | DIASTOLIC BLOOD PRESSURE: 60 MMHG | BODY MASS INDEX: 28.88 KG/M2 | HEIGHT: 67 IN | RESPIRATION RATE: 14 BRPM | TEMPERATURE: 97.1 F | HEART RATE: 58 BPM | WEIGHT: 184 LBS

## 2023-06-09 DIAGNOSIS — R92.8 ABNORMAL MAMMOGRAM: ICD-10-CM

## 2023-06-09 DIAGNOSIS — Z13.0 SCREENING, IRON DEFICIENCY ANEMIA: ICD-10-CM

## 2023-06-09 DIAGNOSIS — Z13.21 ENCOUNTER FOR VITAMIN DEFICIENCY SCREENING: ICD-10-CM

## 2023-06-09 DIAGNOSIS — Z13.1 SCREENING FOR DIABETES MELLITUS: ICD-10-CM

## 2023-06-09 DIAGNOSIS — Z12.31 VISIT FOR SCREENING MAMMOGRAM: ICD-10-CM

## 2023-06-09 DIAGNOSIS — Z00.00 ROUTINE GENERAL MEDICAL EXAMINATION AT A HEALTH CARE FACILITY: Primary | ICD-10-CM

## 2023-06-09 DIAGNOSIS — Z11.59 NEED FOR HEPATITIS C SCREENING TEST: ICD-10-CM

## 2023-06-09 DIAGNOSIS — E55.9 VITAMIN D DEFICIENCY: ICD-10-CM

## 2023-06-09 DIAGNOSIS — Z13.220 SCREENING CHOLESTEROL LEVEL: ICD-10-CM

## 2023-06-09 DIAGNOSIS — Z13.29 SCREENING FOR THYROID DISORDER: ICD-10-CM

## 2023-06-09 LAB
ALBUMIN SERPL BCG-MCNC: 4.3 G/DL (ref 3.5–5.2)
ALP SERPL-CCNC: 63 U/L (ref 35–104)
ALT SERPL W P-5'-P-CCNC: 15 U/L (ref 10–35)
ANION GAP SERPL CALCULATED.3IONS-SCNC: 11 MMOL/L (ref 7–15)
AST SERPL W P-5'-P-CCNC: 25 U/L (ref 10–35)
BILIRUB SERPL-MCNC: 0.5 MG/DL
BUN SERPL-MCNC: 9.3 MG/DL (ref 6–20)
CALCIUM SERPL-MCNC: 9.7 MG/DL (ref 8.6–10)
CHLORIDE SERPL-SCNC: 103 MMOL/L (ref 98–107)
CHOLEST SERPL-MCNC: 155 MG/DL
CREAT SERPL-MCNC: 0.75 MG/DL (ref 0.51–0.95)
DEPRECATED CALCIDIOL+CALCIFEROL SERPL-MC: 11 UG/L (ref 20–75)
DEPRECATED HCO3 PLAS-SCNC: 24 MMOL/L (ref 22–29)
FERRITIN SERPL-MCNC: 15 NG/ML (ref 6–175)
GFR SERPL CREATININE-BSD FRML MDRD: >90 ML/MIN/1.73M2
GLUCOSE SERPL-MCNC: 92 MG/DL (ref 70–99)
HBA1C MFR BLD: 5.2 % (ref 0–5.6)
HCV AB SERPL QL IA: NONREACTIVE
HDLC SERPL-MCNC: 44 MG/DL
HGB BLD-MCNC: 12.6 G/DL (ref 11.7–15.7)
LDLC SERPL CALC-MCNC: 94 MG/DL
NONHDLC SERPL-MCNC: 111 MG/DL
POTASSIUM SERPL-SCNC: 4.3 MMOL/L (ref 3.4–5.3)
PROT SERPL-MCNC: 7.3 G/DL (ref 6.4–8.3)
SODIUM SERPL-SCNC: 138 MMOL/L (ref 136–145)
TRIGL SERPL-MCNC: 86 MG/DL
TSH SERPL DL<=0.005 MIU/L-ACNC: 0.95 UIU/ML (ref 0.3–4.2)

## 2023-06-09 PROCEDURE — 84443 ASSAY THYROID STIM HORMONE: CPT | Performed by: STUDENT IN AN ORGANIZED HEALTH CARE EDUCATION/TRAINING PROGRAM

## 2023-06-09 PROCEDURE — 82728 ASSAY OF FERRITIN: CPT | Performed by: STUDENT IN AN ORGANIZED HEALTH CARE EDUCATION/TRAINING PROGRAM

## 2023-06-09 PROCEDURE — 85018 HEMOGLOBIN: CPT | Performed by: STUDENT IN AN ORGANIZED HEALTH CARE EDUCATION/TRAINING PROGRAM

## 2023-06-09 PROCEDURE — 83036 HEMOGLOBIN GLYCOSYLATED A1C: CPT | Performed by: STUDENT IN AN ORGANIZED HEALTH CARE EDUCATION/TRAINING PROGRAM

## 2023-06-09 PROCEDURE — 36415 COLL VENOUS BLD VENIPUNCTURE: CPT | Performed by: STUDENT IN AN ORGANIZED HEALTH CARE EDUCATION/TRAINING PROGRAM

## 2023-06-09 PROCEDURE — 80053 COMPREHEN METABOLIC PANEL: CPT | Performed by: STUDENT IN AN ORGANIZED HEALTH CARE EDUCATION/TRAINING PROGRAM

## 2023-06-09 PROCEDURE — 77067 SCR MAMMO BI INCL CAD: CPT | Mod: TC | Performed by: RADIOLOGY

## 2023-06-09 PROCEDURE — 99386 PREV VISIT NEW AGE 40-64: CPT | Performed by: STUDENT IN AN ORGANIZED HEALTH CARE EDUCATION/TRAINING PROGRAM

## 2023-06-09 PROCEDURE — 80061 LIPID PANEL: CPT | Performed by: STUDENT IN AN ORGANIZED HEALTH CARE EDUCATION/TRAINING PROGRAM

## 2023-06-09 PROCEDURE — 82306 VITAMIN D 25 HYDROXY: CPT | Performed by: STUDENT IN AN ORGANIZED HEALTH CARE EDUCATION/TRAINING PROGRAM

## 2023-06-09 PROCEDURE — 86803 HEPATITIS C AB TEST: CPT | Performed by: STUDENT IN AN ORGANIZED HEALTH CARE EDUCATION/TRAINING PROGRAM

## 2023-06-09 ASSESSMENT — ENCOUNTER SYMPTOMS
JOINT SWELLING: 0
HEMATURIA: 0
FREQUENCY: 0
HEMATOCHEZIA: 0
BREAST MASS: 0
DYSURIA: 0
HEARTBURN: 0
ARTHRALGIAS: 0
WEAKNESS: 0
ABDOMINAL PAIN: 0
MYALGIAS: 0
CHILLS: 0
CONSTIPATION: 0
COUGH: 0
NAUSEA: 0
FEVER: 0
DIZZINESS: 0
SHORTNESS OF BREATH: 0
EYE PAIN: 0
DIARRHEA: 0
NERVOUS/ANXIOUS: 0
PARESTHESIAS: 0
PALPITATIONS: 0
SORE THROAT: 0
HEADACHES: 0

## 2023-06-09 NOTE — PROGRESS NOTES
SUBJECTIVE:   CC: Luis is an 38 year old who presents for preventive health visit.       2023    10:18 AM   Additional Questions   Roomed by Jazmin MARLOW   Accompanied by Self         2023    10:18 AM   Patient Reported Additional Medications   Patient reports taking the following new medications None     Healthy Habits:     Getting at least 3 servings of Calcium per day:  Yes    Bi-annual eye exam:  Yes    Dental care twice a year:  Yes    Sleep apnea or symptoms of sleep apnea:  None    Diet:  Vegetarian/vegan    Frequency of exercise:  2-3 days/week    Duration of exercise:  30-45 minutes    Taking medications regularly:  Yes    Medication side effects:  None    PHQ-2 Total Score: 0    Additional concerns today:  No    Had gestational diabetes mellitus     Periods were irregular  Normal bleeding; no clots; regular pains      Today's PHQ-2 Score:       2023    10:01 AM   PHQ-2 (  [employer])   Q1: Little interest or pleasure in doing things 0   Q2: Feeling down, depressed or hopeless 0   PHQ-2 Score 0   Q1: Little interest or pleasure in doing things Not at all   Q2: Feeling down, depressed or hopeless Not at all   PHQ-2 Score 0       Social History     Tobacco Use     Smoking status: Never     Smokeless tobacco: Never   Vaping Use     Vaping status: Not on file   Substance Use Topics     Alcohol use: No             2023    11:05 AM   Alcohol Use   Prescreen: >3 drinks/day or >7 drinks/week? Not Applicable     Reviewed orders with patient.  Reviewed health maintenance and updated orders accordingly - Yes    Breast Cancer Screenin/6/2023    11:10 AM   Breast CA Risk Assessment (FHS-7)   Do you have a family history of breast, colon, or ovarian cancer? No / Unknown       Mammogram Screening - Offered annual screening and updated Health Maintenance for mutual plan based on risk factor consideration    Pertinent mammograms are reviewed under the imaging tab.    History of abnormal Pap smear:  "NO - age 30-65 PAP every 5 years with negative HPV co-testing recommended      Latest Ref Rng & Units 11/21/2019     8:50 AM 11/21/2019     8:38 AM 1/16/2015    12:00 AM   PAP / HPV   PAP (Historical)   NIL   NIL     HPV 16 DNA NEG^Negative Negative       HPV 18 DNA NEG^Negative Negative       Other HR HPV NEG^Negative Negative         Reviewed and updated as needed this visit by clinical staff   Tobacco  Allergies  Meds            Isabella Ferrera MD on 6/9/2023 at 10:22 AM    Reviewed and updated as needed this visit by Provider                     Review of Systems   Constitutional: Negative for chills and fever.   HENT: Negative for congestion, ear pain, hearing loss and sore throat.    Eyes: Negative for pain and visual disturbance.   Respiratory: Negative for cough and shortness of breath.    Cardiovascular: Negative for chest pain, palpitations and peripheral edema.   Gastrointestinal: Negative for abdominal pain, constipation, diarrhea, heartburn, hematochezia and nausea.   Breasts:  Negative for tenderness, breast mass and discharge.   Genitourinary: Negative for dysuria, frequency, genital sores, hematuria, pelvic pain, urgency, vaginal bleeding and vaginal discharge.   Musculoskeletal: Negative for arthralgias, joint swelling and myalgias.   Skin: Negative for rash.   Neurological: Negative for dizziness, weakness, headaches and paresthesias.   Psychiatric/Behavioral: Negative for mood changes. The patient is not nervous/anxious.      OBJECTIVE:   /60 (BP Location: Right arm, Patient Position: Sitting, Cuff Size: Adult Regular)   Pulse 58   Temp 97.1  F (36.2  C) (Temporal)   Resp 14   Ht 1.702 m (5' 7\")   Wt 83.5 kg (184 lb)   LMP 05/26/2023 (Exact Date)   SpO2 100%   BMI 28.82 kg/m    Physical Exam  GENERAL: healthy, alert and no distress  EYES: Eyes grossly normal to inspection, and conjunctivae and sclerae normal  HENT: ear canals and TM's normal, nose and mouth without ulcers or " lesions  NECK: no adenopathy, no asymmetry, masses, or scars and thyroid normal to palpation  RESP: lungs clear to auscultation - no rales, rhonchi or wheezes  CV: regular rate and rhythm, normal S1 S2, no S3 or S4, no murmur, click or rub, no peripheral edema and peripheral pulses strong  ABDOMEN: soft, nontender, no hepatosplenomegaly, no masses   MS: no gross musculoskeletal defects noted, no edema  SKIN: no suspicious lesions or rashes  NEURO: Normal strength and tone, mentation intact and speech normal  PSYCH: mentation appears normal, affect normal/bright    ASSESSMENT/PLAN:       ICD-10-CM    1. Routine general medical examination at a health care facility  Z00.00       2. Screening for diabetes mellitus  Z13.1 Hemoglobin A1c     Comprehensive metabolic panel (BMP + Alb, Alk Phos, ALT, AST, Total. Bili, TP)     Hemoglobin A1c     Comprehensive metabolic panel (BMP + Alb, Alk Phos, ALT, AST, Total. Bili, TP)      3. Screening cholesterol level  Z13.220 Lipid panel reflex to direct LDL Fasting     Lipid panel reflex to direct LDL Fasting      4. Screening for thyroid disorder  Z13.29 TSH with free T4 reflex     TSH with free T4 reflex      5. Need for hepatitis C screening test  Z11.59 Hepatitis C Screen Reflex to HCV RNA Quant and Genotype     Hepatitis C Screen Reflex to HCV RNA Quant and Genotype      6. Screening, iron deficiency anemia  Z13.0 Hemoglobin     Ferritin     Hemoglobin     Ferritin      7. Encounter for vitamin deficiency screening  Z13.21 Vitamin D Deficiency     Vitamin D Deficiency            COUNSELING:  Reviewed preventive health counseling, as reflected in patient instructions        She reports that she has never smoked. She has never used smokeless tobacco.          Isabella Ferrera MD  [facility]

## 2023-06-12 RX ORDER — ERGOCALCIFEROL 1.25 MG/1
50000 CAPSULE, LIQUID FILLED ORAL WEEKLY
Qty: 8 CAPSULE | Refills: 0 | Status: SHIPPED | OUTPATIENT
Start: 2023-06-12 | End: 2023-07-24

## 2023-06-19 ENCOUNTER — MYC MEDICAL ADVICE (OUTPATIENT)
Dept: PEDIATRICS | Facility: CLINIC | Age: 38
End: 2023-06-19
Payer: COMMERCIAL

## 2023-07-24 ENCOUNTER — MYC MEDICAL ADVICE (OUTPATIENT)
Dept: PEDIATRICS | Facility: CLINIC | Age: 38
End: 2023-07-24
Payer: COMMERCIAL

## 2023-07-24 DIAGNOSIS — E55.9 VITAMIN D DEFICIENCY: ICD-10-CM

## 2023-07-24 RX ORDER — ERGOCALCIFEROL 1.25 MG/1
50000 CAPSULE, LIQUID FILLED ORAL WEEKLY
Qty: 8 CAPSULE | Refills: 0 | Status: SHIPPED | OUTPATIENT
Start: 2023-07-24

## 2023-10-08 DIAGNOSIS — E55.9 VITAMIN D DEFICIENCY: ICD-10-CM

## 2023-10-09 RX ORDER — ERGOCALCIFEROL 1.25 MG/1
50000 CAPSULE, LIQUID FILLED ORAL WEEKLY
Qty: 8 CAPSULE | Refills: 0 | OUTPATIENT
Start: 2023-10-09

## 2023-10-09 NOTE — TELEPHONE ENCOUNTER
Do not recommend continue high dose vitamin d.    Isabella Ferrera MD  Internal Medicine & Pediatrics  [facility] [place]  She/her

## 2023-10-23 ENCOUNTER — NURSE TRIAGE (OUTPATIENT)
Dept: PEDIATRICS | Facility: CLINIC | Age: 38
End: 2023-10-23
Payer: COMMERCIAL

## 2023-10-23 NOTE — TELEPHONE ENCOUNTER
Nurse Triage SBAR    Is this a 2nd Level Triage? NO    Situation: Pt calls to report her missed period.    Background: Pt reports prior to August, her periods were regular and monthly. Pt denies breastfeeding. Pt reports tubal ligation in 2013. Pt denies recent increase in stress, exercise or recent rapid weight loss.     Assessment: Pt reports LMP 8/21/23. Denies recent bloody discharge. Pt endorses occasional light white discharge. Pt denies pregnancy, denies taking any forms of birth control (s/p tubal ligation). Pt denies pain.     Protocol Recommended Disposition:   See in Office Within 2 Weeks    Recommendation: Recommended pt be seen in clinic for appt per protocol. Pt verbalized understanding. Pt reports she will call back to schedule. Pt denies further inquiry at this time.     Reason for Disposition   Age > 40 years   Missed 2 or more periods in a row    Additional Information   Negative: Sounds like a life-threatening emergency to the triager   Negative: Abdominal pain is present   Negative: Sexually transmitted infection (STI) exposure and prevention, questions about   Negative: Patient sounds very sick or weak to the triager   Negative: Patient wants to be seen   Negative: Pregnant and has IUD   Negative: Pregnant and prior history of 'ectopic pregnancy' or previous tubal surgery (e.g., tubal ligation)   Negative: Pregnant and history of infertility   Negative: Wants a pregnancy test done in the office    Protocols used: Menstrual Period - Missed or Late-A-OH    Martin Hernandez RN on 10/23/2023 at 8:44 AM

## 2024-06-27 ENCOUNTER — ANCILLARY PROCEDURE (OUTPATIENT)
Dept: MAMMOGRAPHY | Facility: CLINIC | Age: 39
End: 2024-06-27
Attending: PEDIATRICS
Payer: COMMERCIAL

## 2024-06-27 DIAGNOSIS — Z12.31 VISIT FOR SCREENING MAMMOGRAM: ICD-10-CM

## 2024-06-27 PROCEDURE — 77063 BREAST TOMOSYNTHESIS BI: CPT | Mod: TC | Performed by: RADIOLOGY

## 2024-06-27 PROCEDURE — 77067 SCR MAMMO BI INCL CAD: CPT | Mod: TC | Performed by: RADIOLOGY

## 2024-07-07 SDOH — HEALTH STABILITY: PHYSICAL HEALTH: ON AVERAGE, HOW MANY DAYS PER WEEK DO YOU ENGAGE IN MODERATE TO STRENUOUS EXERCISE (LIKE A BRISK WALK)?: 7 DAYS

## 2024-07-07 SDOH — HEALTH STABILITY: PHYSICAL HEALTH: ON AVERAGE, HOW MANY MINUTES DO YOU ENGAGE IN EXERCISE AT THIS LEVEL?: 30 MIN

## 2024-07-07 ASSESSMENT — SOCIAL DETERMINANTS OF HEALTH (SDOH): HOW OFTEN DO YOU GET TOGETHER WITH FRIENDS OR RELATIVES?: TWICE A WEEK

## 2024-07-08 ENCOUNTER — OFFICE VISIT (OUTPATIENT)
Dept: PEDIATRICS | Facility: CLINIC | Age: 39
End: 2024-07-08
Payer: COMMERCIAL

## 2024-07-08 VITALS
SYSTOLIC BLOOD PRESSURE: 119 MMHG | BODY MASS INDEX: 30.17 KG/M2 | TEMPERATURE: 98 F | DIASTOLIC BLOOD PRESSURE: 82 MMHG | RESPIRATION RATE: 18 BRPM | OXYGEN SATURATION: 100 % | WEIGHT: 192.2 LBS | HEIGHT: 67 IN | HEART RATE: 82 BPM

## 2024-07-08 DIAGNOSIS — R73.01 IMPAIRED FASTING GLUCOSE: ICD-10-CM

## 2024-07-08 DIAGNOSIS — Z00.00 ROUTINE GENERAL MEDICAL EXAMINATION AT A HEALTH CARE FACILITY: Primary | ICD-10-CM

## 2024-07-08 DIAGNOSIS — Z86.32 PERSONAL HISTORY OF GESTATIONAL DIABETES: ICD-10-CM

## 2024-07-08 DIAGNOSIS — Z12.4 CERVICAL CANCER SCREENING: ICD-10-CM

## 2024-07-08 DIAGNOSIS — E55.9 VITAMIN D DEFICIENCY: ICD-10-CM

## 2024-07-08 DIAGNOSIS — Z86.2 HISTORY OF ANEMIA: ICD-10-CM

## 2024-07-08 PROCEDURE — 99396 PREV VISIT EST AGE 40-64: CPT | Performed by: PEDIATRICS

## 2024-07-08 PROCEDURE — 87624 HPV HI-RISK TYP POOLED RSLT: CPT | Performed by: PEDIATRICS

## 2024-07-08 PROCEDURE — G0145 SCR C/V CYTO,THINLAYER,RESCR: HCPCS | Performed by: PEDIATRICS

## 2024-07-08 RX ORDER — ERGOCALCIFEROL 1.25 MG/1
50000 CAPSULE, LIQUID FILLED ORAL WEEKLY
Qty: 8 CAPSULE | Refills: 11 | Status: CANCELLED | OUTPATIENT
Start: 2024-07-08

## 2024-07-08 NOTE — PROGRESS NOTES
"Preventive Care Visit  [facility] MYRNA Mendez MD, Internal Medicine - Pediatrics  Jul 8, 2024      Assessment & Plan       ICD-10-CM    1. Routine general medical examination at a health care facility  Z00.00 PRIMARY CARE FOLLOW-UP SCHEDULING     Vitamin D Deficiency     Hemoglobin A1c     Lipid panel reflex to direct LDL Fasting     Comprehensive metabolic panel (BMP + Alb, Alk Phos, ALT, AST, Total. Bili, TP)     CBC with platelets     Hepatitis B Surface Antibody     Hepatitis B surface antigen     Hepatitis B core antibody    Encouraged in her fantastic exercise!  Check labs  UTD on cancer screenings after pap today.    Plan yearly 3D mammo      2. History of anemia  Z86.2 CBC with platelets      3. Impaired fasting glucose  R73.01 Hemoglobin A1c      4. Vitamin D deficiency  E55.9 Vitamin D Deficiency      5. Personal history of gestational diabetes  Z86.32 Recheck A1C      6. Cervical cancer screening  Z12.4 Gynecologic Cytology (Pap) and HPV - Recommended Age 30-65 Years              BMI  Estimated body mass index is 30.24 kg/m  as calculated from the following:    Height as of this encounter: 1.698 m (5' 6.85\").    Weight as of this encounter: 87.2 kg (192 lb 3.2 oz).   Weight management plan: Discussed healthy diet and exercise guidelines    Counseling  Appropriate preventive services were discussed with this patient    See Patient Instructions    Sagar Smith is a 39 year old, presenting for the following:  Physical        7/8/2024     3:16 PM   Additional Questions   Roomed by miss         7/8/2024     3:13 PM   Patient Reported Additional Medications   Patient reports taking the following new medications no        Health Care Directive  Patient does not have a Health Care Directive or Living Will: Discussed advance care planning with patient; however, patient declined at this time.    HPI        7/7/2024   General Health   How would you rate your overall physical " health? Excellent   Feel stress (tense, anxious, or unable to sleep) Not at all            7/7/2024   Nutrition   Three or more servings of calcium each day? Yes   Diet: Regular (no restrictions)   How many servings of fruit and vegetables per day? (!) 2-3   How many sweetened beverages each day? 0-1            7/7/2024   Exercise   Days per week of moderate/strenous exercise 7 days   Average minutes spent exercising at this level 30 min            7/7/2024   Social Factors   Frequency of gathering with friends or relatives Twice a week   Worry food won't last until get money to buy more No   Food not last or not have enough money for food? No   Do you have housing? (Housing is defined as stable permanent housing and does not include staying ouside in a car, in a tent, in an abandoned building, in an overnight shelter, or couch-surfing.) Yes   Are you worried about losing your housing? No   Lack of transportation? No   Unable to get utilities (heat,electricity)? No            7/7/2024   Dental   Dentist two times every year? Yes            7/7/2024   TB Screening   Were you born outside of the [place]? Yes            Today's PHQ-2 Score:       7/7/2024     4:49 PM   PHQ-2 ( 1999 [employer])   Q1: Little interest or pleasure in doing things 1   Q2: Feeling down, depressed or hopeless 0   PHQ-2 Score 1   Q1: Little interest or pleasure in doing things Several days   Q2: Feeling down, depressed or hopeless Not at all   PHQ-2 Score 1           7/7/2024   Substance Use   Alcohol more than 3/day or more than 7/wk Not Applicable   Do you use any other substances recreationally? (!) DECLINE        Social History     Tobacco Use    Smoking status: Never    Smokeless tobacco: Never   Substance Use Topics    Alcohol use: No    Drug use: No           6/27/2024   LAST FHS-7 RESULTS   1st degree relative breast or ovarian cancer No   Any relative bilateral breast cancer No   Any male have breast cancer No   Any ONE woman have BOTH breast  "AND ovarian cancer No   Any woman with breast cancer before 50yrs No   2 or more relatives with breast AND/OR ovarian cancer No   2 or more relatives with breast AND/OR bowel cancer No        Mammogram Screening - Mammogram every 1-2 years updated in Health Maintenance based on mutual decision making        7/7/2024   STI Screening   New sexual partner(s) since last STI/HIV test? No        History of abnormal Pap smear: No - age 30-64 HPV with reflex Pap every 5 years recommended        Latest Ref Rng & Units 11/21/2019     8:50 AM 11/21/2019     8:38 AM 1/16/2015    12:00 AM   PAP / HPV   PAP (Historical)   NIL  NIL    HPV 16 DNA NEG^Negative Negative      HPV 18 DNA NEG^Negative Negative      Other HR HPV NEG^Negative Negative        ASCVD Risk   The 10-year ASCVD risk score (Abraham PAYNE, et al., 2019) is: 0.5%    Values used to calculate the score:      Age: 39 years      Sex: Female      Is Non- : No      Diabetic: No      Tobacco smoker: No      Systolic Blood Pressure: 119 mmHg      Is BP treated: No      HDL Cholesterol: 44 mg/dL      Total Cholesterol: 155 mg/dL       Reviewed and updated as needed this visit by Provider                    Hx of vit D deficiency and anemia - due for lab work.  Not currently taking vitamins apart from a MVI intermittently    Hx of gestational diabetes     Walking every day for exercise!    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       Objective    Exam  /82 (BP Location: Right arm, Patient Position: Sitting, Cuff Size: Adult Large)   Pulse 82   Temp 98  F (36.7  C) (Tympanic)   Resp 18   Ht 1.698 m (5' 6.85\")   Wt 87.2 kg (192 lb 3.2 oz)   LMP 06/11/2024   SpO2 100%   BMI 30.24 kg/m     Estimated " "body mass index is 30.24 kg/m  as calculated from the following:    Height as of this encounter: 1.698 m (5' 6.85\").    Weight as of this encounter: 87.2 kg (192 lb 3.2 oz).  Wt Readings from Last 4 Encounters:   07/08/24 87.2 kg (192 lb 3.2 oz)   06/09/23 83.5 kg (184 lb)   03/09/22 80.6 kg (177 lb 12.8 oz)   11/21/19 78.2 kg (172 lb 8 oz)         Physical Exam  GENERAL: alert and no distress  EYES: Eyes grossly normal to inspection, PERRL and conjunctivae and sclerae normal  HENT: ear canals and TM's normal, nose and mouth without ulcers or lesions  NECK: no adenopathy, no asymmetry, masses, or scars  RESP: lungs clear to auscultation - no rales, rhonchi or wheezes  BREAST: normal without masses, tenderness or nipple discharge and no palpable axillary masses or adenopathy  CV: regular rate and rhythm, normal S1 S2, no S3 or S4, no murmur, click or rub, no peripheral edema  ABDOMEN: soft, nontender, no hepatosplenomegaly, no masses and bowel sounds normal   (female) w/bimanual: normal female external genitalia, normal urethral meatus, normal vaginal mucosa, and normal cervix/adnexa/uterus without masses or discharge  MS: no gross musculoskeletal defects noted, no edema  SKIN: no suspicious lesions or rashes  NEURO: Normal strength and tone, mentation intact and speech normal  PSYCH: mentation appears normal, affect normal/bright        Signed Electronically by: Faiza Mendez MD    "

## 2024-07-08 NOTE — PATIENT INSTRUCTIONS
Patient Education   Preventive Care Advice   This is general advice given by our system to help you stay healthy. However, your care team may have specific advice just for you. Please talk to your care team about your preventive care needs.  Nutrition  Eat 5 or more servings of fruits and vegetables each day.  Try wheat bread, brown rice and whole grain pasta (instead of white bread, rice, and pasta).  Get enough calcium and vitamin D. Check the label on foods and aim for 100% of the RDA (recommended daily allowance).  Lifestyle  Exercise at least 150 minutes each week  (30 minutes a day, 5 days a week).  Do muscle strengthening activities 2 days a week. These help control your weight and prevent disease.  No smoking.  Wear sunscreen to prevent skin cancer.  Have a dental exam and cleaning every 6 months.  Yearly exams  See your health care team every year to talk about:  Any changes in your health.  Any medicines your care team has prescribed.  Preventive care, family planning, and ways to prevent chronic diseases.  Shots (vaccines)   HPV shots (up to age 26), if you've never had them before.  Hepatitis B shots (up to age 59), if you've never had them before.  COVID-19 shot: Get this shot when it's due.  Flu shot: Get a flu shot every year.  Tetanus shot: Get a tetanus shot every 10 years.  Pneumococcal, hepatitis A, and RSV shots: Ask your care team if you need these based on your risk.  Shingles shot (for age 50 and up)  General health tests  Diabetes screening:  Starting at age 35, Get screened for diabetes at least every 3 years.  If you are younger than age 35, ask your care team if you should be screened for diabetes.  Cholesterol test: At age 39, start having a cholesterol test every 5 years, or more often if advised.  Bone density scan (DEXA): At age 50, ask your care team if you should have this scan for osteoporosis (brittle bones).  Hepatitis C: Get tested at least once in your life.  STIs (sexually  transmitted infections)  Before age 24: Ask your care team if you should be screened for STIs.  After age 24: Get screened for STIs if you're at risk. You are at risk for STIs (including HIV) if:  You are sexually active with more than one person.  You don't use condoms every time.  You or a partner was diagnosed with a sexually transmitted infection.  If you are at risk for HIV, ask about PrEP medicine to prevent HIV.  Get tested for HIV at least once in your life, whether you are at risk for HIV or not.  Cancer screening tests  Cervical cancer screening: If you have a cervix, begin getting regular cervical cancer screening tests starting at age 21.  Breast cancer scan (mammogram): If you've ever had breasts, begin having regular mammograms starting at age 40. This is a scan to check for breast cancer.  Colon cancer screening: It is important to start screening for colon cancer at age 45.  Have a colonoscopy test every 10 years (or more often if you're at risk) Or, ask your provider about stool tests like a FIT test every year or Cologuard test every 3 years.  To learn more about your testing options, visit:   .  For help making a decision, visit:   https://bit.ly/gt28069.  Prostate cancer screening test: If you have a prostate, ask your care team if a prostate cancer screening test (PSA) at age 55 is right for you.  Lung cancer screening: If you are a current or former smoker ages 50 to 80, ask your care team if ongoing lung cancer screenings are right for you.  For informational purposes only. Not to replace the advice of your health care provider. Copyright   2023 [employer]. All rights reserved. Clinically reviewed by the [facility]. [employer] 257566 - REV 01/24.

## 2024-07-10 LAB
HPV HR 12 DNA CVX QL NAA+PROBE: NEGATIVE
HPV16 DNA CVX QL NAA+PROBE: NEGATIVE
HPV18 DNA CVX QL NAA+PROBE: NEGATIVE
HUMAN PAPILLOMA VIRUS FINAL DIAGNOSIS: NORMAL

## 2024-07-12 LAB
BKR LAB AP GYN ADEQUACY: NORMAL
BKR LAB AP GYN INTERPRETATION: NORMAL
BKR LAB AP PREVIOUS ABNORMAL: NORMAL
PATH REPORT.COMMENTS IMP SPEC: NORMAL
PATH REPORT.COMMENTS IMP SPEC: NORMAL
PATH REPORT.RELEVANT HX SPEC: NORMAL

## 2024-07-13 ENCOUNTER — LAB (OUTPATIENT)
Dept: LAB | Facility: CLINIC | Age: 39
End: 2024-07-13
Payer: COMMERCIAL

## 2024-07-13 DIAGNOSIS — Z00.00 ROUTINE GENERAL MEDICAL EXAMINATION AT A HEALTH CARE FACILITY: ICD-10-CM

## 2024-07-13 DIAGNOSIS — R73.01 IMPAIRED FASTING GLUCOSE: ICD-10-CM

## 2024-07-13 DIAGNOSIS — Z86.2 HISTORY OF ANEMIA: ICD-10-CM

## 2024-07-13 DIAGNOSIS — E55.9 VITAMIN D DEFICIENCY: ICD-10-CM

## 2024-07-13 LAB
ALBUMIN SERPL BCG-MCNC: 4.2 G/DL (ref 3.5–5.2)
ALP SERPL-CCNC: 69 U/L (ref 40–150)
ALT SERPL W P-5'-P-CCNC: 13 U/L (ref 0–50)
ANION GAP SERPL CALCULATED.3IONS-SCNC: 9 MMOL/L (ref 7–15)
AST SERPL W P-5'-P-CCNC: 19 U/L (ref 0–45)
BILIRUB SERPL-MCNC: 0.4 MG/DL
BUN SERPL-MCNC: 9.5 MG/DL (ref 6–20)
CALCIUM SERPL-MCNC: 9.3 MG/DL (ref 8.6–10)
CHLORIDE SERPL-SCNC: 109 MMOL/L (ref 98–107)
CHOLEST SERPL-MCNC: 163 MG/DL
CREAT SERPL-MCNC: 0.74 MG/DL (ref 0.51–0.95)
DEPRECATED HCO3 PLAS-SCNC: 26 MMOL/L (ref 22–29)
EGFRCR SERPLBLD CKD-EPI 2021: >90 ML/MIN/1.73M2
ERYTHROCYTE [DISTWIDTH] IN BLOOD BY AUTOMATED COUNT: 12.9 % (ref 10–15)
FASTING STATUS PATIENT QL REPORTED: YES
FASTING STATUS PATIENT QL REPORTED: YES
GLUCOSE SERPL-MCNC: 100 MG/DL (ref 70–99)
HBA1C MFR BLD: 5.4 % (ref 0–5.6)
HCT VFR BLD AUTO: 38.8 % (ref 35–47)
HDLC SERPL-MCNC: 41 MG/DL
HGB BLD-MCNC: 13 G/DL (ref 11.7–15.7)
LDLC SERPL CALC-MCNC: 95 MG/DL
MCH RBC QN AUTO: 27.9 PG (ref 26.5–33)
MCHC RBC AUTO-ENTMCNC: 33.5 G/DL (ref 31.5–36.5)
MCV RBC AUTO: 83 FL (ref 78–100)
NONHDLC SERPL-MCNC: 122 MG/DL
PLATELET # BLD AUTO: 266 10E3/UL (ref 150–450)
POTASSIUM SERPL-SCNC: 4.3 MMOL/L (ref 3.4–5.3)
PROT SERPL-MCNC: 7.2 G/DL (ref 6.4–8.3)
RBC # BLD AUTO: 4.66 10E6/UL (ref 3.8–5.2)
SODIUM SERPL-SCNC: 144 MMOL/L (ref 135–145)
TRIGL SERPL-MCNC: 137 MG/DL
VIT D+METAB SERPL-MCNC: 19 NG/ML (ref 20–50)
WBC # BLD AUTO: 5.1 10E3/UL (ref 4–11)

## 2024-07-13 PROCEDURE — 80053 COMPREHEN METABOLIC PANEL: CPT

## 2024-07-13 PROCEDURE — 86706 HEP B SURFACE ANTIBODY: CPT

## 2024-07-13 PROCEDURE — 80061 LIPID PANEL: CPT

## 2024-07-13 PROCEDURE — 87340 HEPATITIS B SURFACE AG IA: CPT

## 2024-07-13 PROCEDURE — 85027 COMPLETE CBC AUTOMATED: CPT

## 2024-07-13 PROCEDURE — 83036 HEMOGLOBIN GLYCOSYLATED A1C: CPT

## 2024-07-13 PROCEDURE — 36415 COLL VENOUS BLD VENIPUNCTURE: CPT

## 2024-07-13 PROCEDURE — 86704 HEP B CORE ANTIBODY TOTAL: CPT

## 2024-07-13 PROCEDURE — 82306 VITAMIN D 25 HYDROXY: CPT

## 2024-07-14 LAB
HBV CORE AB SERPL QL IA: NONREACTIVE
HBV SURFACE AB SERPL IA-ACNC: <3.5 M[IU]/ML
HBV SURFACE AB SERPL IA-ACNC: NONREACTIVE M[IU]/ML
HBV SURFACE AG SERPL QL IA: NONREACTIVE

## 2024-07-15 ENCOUNTER — MYC MEDICAL ADVICE (OUTPATIENT)
Dept: PEDIATRICS | Facility: CLINIC | Age: 39
End: 2024-07-15
Payer: COMMERCIAL

## 2025-03-12 ENCOUNTER — TELEPHONE (OUTPATIENT)
Dept: PEDIATRICS | Facility: CLINIC | Age: 40
End: 2025-03-12
Payer: COMMERCIAL

## 2025-03-12 NOTE — TELEPHONE ENCOUNTER
Reason for Call:  Appointment Request    Patient requesting this type of appt:  Preventive     Requested provider: Faiza Mendez    Reason patient unable to be scheduled:  Pt would like to reschedule. Providers appts not coming up in central scheduling    When does patient want to be seen/preferred time:       Comments:      [facility] or would you prefer to receive a phone call?:   Patient would prefer a phone call   Okay to leave a detailed message?: Yes at Cell number on file:    Telephone Information:   Mobile [phone]       Call taken on 3/12/2025 at 11:48 AM by Ashley Lopez